# Patient Record
Sex: MALE | Race: WHITE | NOT HISPANIC OR LATINO | Employment: FULL TIME | URBAN - METROPOLITAN AREA
[De-identification: names, ages, dates, MRNs, and addresses within clinical notes are randomized per-mention and may not be internally consistent; named-entity substitution may affect disease eponyms.]

---

## 2024-07-16 ENCOUNTER — VIRTUAL VISIT (OUTPATIENT)
Dept: FAMILY MEDICINE | Facility: CLINIC | Age: 49
End: 2024-07-16
Payer: COMMERCIAL

## 2024-07-16 DIAGNOSIS — U07.1 INFECTION DUE TO 2019 NOVEL CORONAVIRUS: Primary | ICD-10-CM

## 2024-07-16 DIAGNOSIS — Z12.11 SCREEN FOR COLON CANCER: ICD-10-CM

## 2024-07-16 PROBLEM — G47.33 OSA ON CPAP: Status: ACTIVE | Noted: 2020-06-05

## 2024-07-16 PROBLEM — R20.0 NUMBNESS OF FEET: Status: ACTIVE | Noted: 2018-07-06

## 2024-07-16 PROCEDURE — 99203 OFFICE O/P NEW LOW 30 MIN: CPT | Mod: 95 | Performed by: FAMILY MEDICINE

## 2024-07-16 RX ORDER — CYCLOBENZAPRINE HCL 10 MG
5-10 TABLET ORAL
COMMUNITY
Start: 2023-09-13

## 2024-07-16 RX ORDER — MULTIVITAMIN
1 TABLET ORAL DAILY
COMMUNITY

## 2024-07-16 RX ORDER — VALACYCLOVIR HYDROCHLORIDE 1 G/1
TABLET, FILM COATED ORAL
COMMUNITY
Start: 2024-06-01 | End: 2024-09-09

## 2024-07-16 NOTE — PROGRESS NOTES
Mehrdad is a 48 year old who is being evaluated via a billable telephone visit.    How would you like to obtain your AVS? Mail a copy  If the video visit is dropped, the invitation should be resent by: Text to cell phone: [phone]  Will anyone else be joining your video visit? No  Originating Location (pt. Location): Home    Distant Location (provider location):  On-site    Assessment & Plan   Problem List Items Addressed This Visit    None  Visit Diagnoses       Infection due to 2019 novel coronavirus    -  Primary    Treatment as per orders.  Over-the-counter symptom control also reviewed.  Warning signs and symptoms for return to clinic or ER discussed    Relevant Medications    nirmatrelvir and ritonavir (PAXLOVID) 300 mg/100 mg therapy pack    Screen for colon cancer        Relevant Orders    Colonoscopy Screening  Referral             See Patient Instructions      Subjective   Mehrdad is a 48 year old, presenting for the following health issues:  Covid Concern (Positive COVID Test on 7/16/24; Sx's Began 7/14/24; Sx's--Chest Congestion/Fatigue/Runny Nose)      7/16/2024     2:32 PM   Additional Questions   Roomed by CHERYL Joe   Accompanied by Self         7/16/2024     2:32 PM   Patient Reported Additional Medications   Patient reports taking the following new medications N/A     Cough and cold symptoms started about a day day and a half ago.  Then developed fever and chills leading himself to testing for COVID.  He does work for [employer].  He had COVID test was positive.  This is the third time he has had COVID the last was over a year ago.  He is never been on Paxlovid for it but he also knows this and has heard that it can make you feel better.  He understands that there is a bad taste in the mouth.  His medications were reviewed as well as over-the-counter supplements.  He has no contraindications to taking the medication and he does have a positive COVID test.  He did not receive  the last round of COVID vaccinations.    History of Present Illness       Reason for visit:  Covid Positive 7/16/24  Symptom onset:  1-3 days ago  Symptoms include:  Chest Congestion/Fatigue/Runny Nose  Symptom intensity:  Moderate  Symptom progression:  Worsening  Had these symptoms before:  Yes  Has tried/received treatment for these symptoms:  No    He eats 2-3 servings of fruits and vegetables daily.He consumes 1 sweetened beverage(s) daily.He exercises with enough effort to increase his heart rate 60 or more minutes per day.  He exercises with enough effort to increase his heart rate 5 days per week.   He is taking medications regularly.         Objective    Vitals - Patient Reported  Weight (Patient Reported): 95.7 kg (211 lb)        Physical Exam  Nursing note reviewed.   Constitutional:       General: He is not in acute distress.     Appearance: Normal appearance. He is not ill-appearing.   HENT:      Head: Normocephalic and atraumatic.   Eyes:      Extraocular Movements: Extraocular movements intact.      Conjunctiva/sclera: Conjunctivae normal.   Pulmonary:      Effort: Pulmonary effort is normal.   Neurological:      Mental Status: He is alert and oriented to person, place, and time.   Psychiatric:         Attention and Perception: Attention normal.         Mood and Affect: Mood normal.         Speech: Speech normal.         Thought Content: Thought content normal.           Video phone call.  "[employer] video used.  Patient was at home in [place].  Provider was onsite.  Signed Electronically by: Elliot Soto DO

## 2024-07-18 ENCOUNTER — TELEPHONE (OUTPATIENT)
Dept: GASTROENTEROLOGY | Facility: CLINIC | Age: 49
End: 2024-07-18

## 2024-07-18 VITALS — HEIGHT: 74 IN | WEIGHT: 212 LBS | BODY MASS INDEX: 27.21 KG/M2

## 2024-07-18 NOTE — TELEPHONE ENCOUNTER
"Endoscopy Scheduling Screen    Have you had a positive Covid test in the last 14 days?  Yes (Schedule at least 14 days from symptom onset)    What is your communication preference for Instructions and/or Bowel Prep?   MyChart    What insurance is in the chart?  Other:  [facility]    Ordering/Referring Provider:     JOSÉ LUIS THAPA      (If ordering provider performs procedure, schedule with ordering provider unless otherwise instructed. )    BMI: There is no height or weight on file to calculate BMI.     Sedation Ordered  moderate sedation.   If patient BMI > 50 do not schedule in [facility].    If patient BMI > 45 do not schedule at [facility].    Are you taking methadone or Suboxone?  No    Have you had difficulties, pain, or discomfort during past endoscopy procedures?  No    Are you taking any prescription medications for pain 3 or more times per week?   NO, No RN review required.    Do you have a history of malignant hyperthermia?  No    (Females) Are you currently pregnant?        Have you been diagnosed or told you have pulmonary hypertension?   No    Do you have an LVAD?  No    Have you been told you have moderate to severe sleep apnea?  Yes (RN Review required for scheduling unless scheduling in Hospital.)  cpap  Have you been told you have COPD, asthma, or any other lung disease?  No    Do you have any heart conditions?  No     Have you ever had or are you waiting for an organ transplant?  No. Continue scheduling, no site restrictions.    Have you had a stroke or transient ischemic attack (TIA aka \"mini stroke\" in the last 6 months?   No    Have you been diagnosed with or been told you have cirrhosis of the liver?   No    Are you currently on dialysis?   No    Do you need assistance transferring?   No    BMI: There is no height or weight on file to calculate BMI.     Is patients BMI > 40 and scheduling location [facility]?  No    Do you take an injectable medication for weight loss or diabetes (excluding insulin)?  No    Do " you take the medication Naltrexone?  No    Do you take blood thinners?  No       Prep   Are you currently on dialysis or do you have chronic kidney disease?  No    Do you have a diagnosis of diabetes?  No    Do you have a diagnosis of cystic fibrosis (CF)?  No    On a regular basis do you go 3 -5 days between bowel movements?  No    BMI > 40?  No    Preferred Pharmacy:    [facility] [address] [facility] [address]  [address] [address]  Phone: [phone] Fax: [phone]      Final Scheduling Details     Procedure scheduled  Colonoscopy    Surgeon:  Rolan     Date of procedure:  9/26/24     Pre-OP / PAC:   No - Not required for this site.    Location  [facility] - Patient preference.    Sedation   Moderate Sedation - Per order.      Patient Reminders:   You will receive a call from a Nurse to review instructions and health history.  This assessment must be completed prior to your procedure.  Failure to complete the Nurse assessment may result in the procedure being cancelled.      On the day of your procedure, please designate an adult(s) who can drive you home stay with you for the next 24 hours. The medicines used in the exam will make you sleepy. You will not be able to drive.      You cannot take public transportation, ride share services, or non-medical taxi service without a responsible caregiver.  Medical transport services are allowed with the requirement that a responsible caregiver will receive you at your destination.  We require that drivers and caregivers are confirmed prior to your procedure.

## 2024-07-20 ENCOUNTER — HEALTH MAINTENANCE LETTER (OUTPATIENT)
Age: 49
End: 2024-07-20

## 2024-08-12 ENCOUNTER — TELEPHONE (OUTPATIENT)
Dept: FAMILY MEDICINE | Facility: CLINIC | Age: 49
End: 2024-08-12

## 2024-08-12 ENCOUNTER — MYC MEDICAL ADVICE (OUTPATIENT)
Dept: FAMILY MEDICINE | Facility: CLINIC | Age: 49
End: 2024-08-12

## 2024-08-12 NOTE — TELEPHONE ENCOUNTER
Patient Quality Outreach    Patient is due for the following:   Colon Cancer Screening    Next Steps:   Colon Cancer Screening    Type of outreach:    Sent [employer] message.      Questions for provider review:    None           Arik Joe Jr., MA  Chart routed to Care Team.

## 2024-09-05 NOTE — TELEPHONE ENCOUNTER
Standard Miralax Bowel Prep recommended due to standard bowel prep. Instructions were sent via [employer].

## 2024-09-09 ENCOUNTER — OFFICE VISIT (OUTPATIENT)
Dept: PEDIATRICS | Facility: CLINIC | Age: 49
End: 2024-09-09
Payer: COMMERCIAL

## 2024-09-09 VITALS
HEIGHT: 74 IN | BODY MASS INDEX: 27.85 KG/M2 | TEMPERATURE: 98.7 F | DIASTOLIC BLOOD PRESSURE: 70 MMHG | HEART RATE: 76 BPM | SYSTOLIC BLOOD PRESSURE: 110 MMHG | OXYGEN SATURATION: 96 % | RESPIRATION RATE: 14 BRPM | WEIGHT: 217 LBS

## 2024-09-09 DIAGNOSIS — B00.1 RECURRENT COLD SORES: ICD-10-CM

## 2024-09-09 DIAGNOSIS — Z23 ENCOUNTER FOR ADMINISTRATION OF VACCINE: ICD-10-CM

## 2024-09-09 DIAGNOSIS — D17.30 LIPOMA OF SKIN AND SUBCUTANEOUS TISSUE: ICD-10-CM

## 2024-09-09 DIAGNOSIS — L71.9 ROSACEA: ICD-10-CM

## 2024-09-09 DIAGNOSIS — Z00.00 HEALTH CARE MAINTENANCE: ICD-10-CM

## 2024-09-09 DIAGNOSIS — Z13.6 ENCOUNTER FOR SCREENING FOR CARDIOVASCULAR DISORDERS: ICD-10-CM

## 2024-09-09 DIAGNOSIS — Z76.89 ENCOUNTER TO ESTABLISH CARE: Primary | ICD-10-CM

## 2024-09-09 DIAGNOSIS — Z11.3 ROUTINE SCREENING FOR STI (SEXUALLY TRANSMITTED INFECTION): ICD-10-CM

## 2024-09-09 PROCEDURE — 90656 IIV3 VACC NO PRSV 0.5 ML IM: CPT | Performed by: NURSE PRACTITIONER

## 2024-09-09 PROCEDURE — 90472 IMMUNIZATION ADMIN EACH ADD: CPT | Performed by: NURSE PRACTITIONER

## 2024-09-09 PROCEDURE — 90715 TDAP VACCINE 7 YRS/> IM: CPT | Performed by: NURSE PRACTITIONER

## 2024-09-09 PROCEDURE — G2211 COMPLEX E/M VISIT ADD ON: HCPCS | Performed by: NURSE PRACTITIONER

## 2024-09-09 PROCEDURE — 90471 IMMUNIZATION ADMIN: CPT | Performed by: NURSE PRACTITIONER

## 2024-09-09 PROCEDURE — 99214 OFFICE O/P EST MOD 30 MIN: CPT | Performed by: NURSE PRACTITIONER

## 2024-09-09 RX ORDER — VALACYCLOVIR HYDROCHLORIDE 1 G/1
2000 TABLET, FILM COATED ORAL 2 TIMES DAILY
Qty: 4 TABLET | Refills: 3 | Status: SHIPPED | OUTPATIENT
Start: 2024-09-09

## 2024-09-09 ASSESSMENT — PAIN SCALES - GENERAL: PAINLEVEL: NO PAIN (0)

## 2024-09-09 NOTE — PROGRESS NOTES
Assessment & Plan     Encounter to establish care  Histories and medications reviewed and updated.     Lipoma of skin and subcutaneous tissue  History of lipomas, now with several more growths on his abdomen that have been progressively growing and becoming bothersome.   - Adult Gen Surg  Referral; Future    Rosacea  Chronic, stable. Refilled.   - metroNIDAZOLE (METROCREAM) 0.75 % external cream; Apply topically 2 times daily.    Recurrent cold sores  Chronic, stable. Refilled.   - valACYclovir (VALTREX) 1000 mg tablet; Take 2 tablets (2,000 mg) by mouth 2 times daily.    Encounter for screening for cardiovascular disorders  - Lipid panel reflex to direct LDL Fasting; Future    Routine screening for STI (sexually transmitted infection)  - HIV Antigen Antibody Combo; Future  - Treponema Abs w Reflex to RPR and Titer; Future  - Hepatitis C Screen Reflex to HCV RNA Quant and Genotype; Future  - NEISSERIA GONORRHOEA PCR; Future  - CHLAMYDIA TRACHOMATIS PCR; Future  - PRIMARY CARE FOLLOW-UP SCHEDULING; Future    Encounter for administration of vaccine  - TDAP 10-64Y (ADACEL,BOOSTRIX)  - INFLUENZA VACCINE,SPLIT VIRUS,TRIVALENT,PF(FLUZONE)    Health care maintenance  - Comprehensive metabolic panel (BMP + Alb, Alk Phos, ALT, AST, Total. Bili, TP); Future        The longitudinal plan of care for the diagnosis(es)/condition(s) as documented were addressed during this visit. Due to the added complexity in care, I will continue to support Adam in the subsequent management and with ongoing continuity of care.    Subjective   Adam is a 48 year old, presenting for the following health issues:  Musculoskeletal Problem      9/9/2024     1:47 PM   Additional Questions   Roomed by Nata BRIDGES   Accompanied by Self         9/9/2024     1:47 PM   Patient Reported Additional Medications   Patient reports taking the following new medications No       History of subcutaneous masses in abdomen and bilateral upper extremities. They  have been there for years. He thinks they are lipomas. He has had one removed in the past, about 7 years ago. The ones in his abdomen are growing and are not visible and are bothersome.     Recently switched into the [employer] system and needs some medication refills.     Rosacea - managed on topical metronidazole    Cold sores - uses valtrex as needed.     Patient Active Problem List   Diagnosis    Recurrent cold sores    ROBERT on CPAP    Numbness of feet    Idiopathic scoliosis and kyphoscoliosis     No past medical history on file.    Past Surgical History:   Procedure Laterality Date    ABDOMEN SURGERY      pyloric stenosis as a child    EYE MUSCLE SURGERY      childhood    HAND SURGERY Right 2022    INGUINAL HERNIA REPAIR Left     SHOULDER SURGERY Right 2010    VASECTOMY       Family History   Problem Relation Age of Onset    Heart Defect Father         thick valve?    Cancer Paternal Aunt     Cancer Cousin     Cancer Cousin      Social History     Socioeconomic History    Marital status:      Spouse name: Not on file    Number of children: Not on file    Years of education: Not on file    Highest education level: Not on file   Occupational History    Not on file   Tobacco Use    Smoking status: Never     Passive exposure: Never    Smokeless tobacco: Never   Vaping Use    Vaping status: Never Used   Substance and Sexual Activity    Alcohol use: Yes     Comment: 10/week    Drug use: Yes     Types: Marijuana    Sexual activity: Yes     Partners: Female   Other Topics Concern    Not on file   Social History Narrative     2 years ago.      Social Determinants of Health     Financial Resource Strain: Low Risk  (7/16/2024)    Financial Resource Strain     Within the past 12 months, have you or your family members you live with been unable to get utilities (heat, electricity) when it was really needed?: No   Food Insecurity: Low Risk  (7/16/2024)    Food Insecurity     Within the past 12 months, did you  worry that your food would run out before you got money to buy more?: No     Within the past 12 months, did the food you bought just not last and you didn t have money to get more?: No   Transportation Needs: Low Risk  (7/16/2024)    Transportation Needs     Within the past 12 months, has lack of transportation kept you from medical appointments, getting your medicines, non-medical meetings or appointments, work, or from getting things that you need?: No   Physical Activity: Insufficiently Active (1/13/2021)    Received from [facility]    Exercise Vital Sign     Days of Exercise per Week: 3 days     Minutes of Exercise per Session: 30 min   Stress: Not on file   Social Connections: Unknown (5/30/2023)    Received from [facility]    Social Connections     Frequency of Communication with Friends and Family: Not on file   Interpersonal Safety: Low Risk  (9/9/2024)    Interpersonal Safety     Do you feel physically and emotionally safe where you currently live?: Yes     Within the past 12 months, have you been hit, slapped, kicked or otherwise physically hurt by someone?: No     Within the past 12 months, have you been humiliated or emotionally abused in other ways by your partner or ex-partner?: No   Housing Stability: Low Risk  (7/16/2024)    Housing Stability     Do you have housing? : Yes     Are you worried about losing your housing?: No     Current Outpatient Medications   Medication Sig Dispense Refill    cyclobenzaprine (FLEXERIL) 10 MG tablet Take 5-10 mg by mouth      metroNIDAZOLE (METROCREAM) 0.75 % external cream Apply topically 2 times daily. 45 g 2    Multiple Vitamin (ONE-A-DAY ESSENTIAL) TABS Take 1 tablet by mouth daily      valACYclovir (VALTREX) 1000 mg tablet Take 2 tablets (2,000 mg) by mouth 2 times daily. 4 tablet 3     No current facility-administered medications for this visit.      No Known Allergies          Objective    BP  "110/70 (BP Location: Right arm, Patient Position: Sitting, Cuff Size: Adult Large)   Pulse 76   Temp 98.7  F (37.1  C) (Tympanic)   Resp 14   Ht 1.88 m (6' 2.02\")   Wt 98.4 kg (217 lb)   SpO2 96%   BMI 27.85 kg/m    Body mass index is 27.85 kg/m .  Physical Exam  Constitutional:       General: He is not in acute distress.     Appearance: Normal appearance. He is not ill-appearing or toxic-appearing.   Cardiovascular:      Rate and Rhythm: Normal rate.   Pulmonary:      Effort: Pulmonary effort is normal. No respiratory distress.   Abdominal:          Comments: Several subcutaneous masses palpated throughout abdomen as pictured.    Skin:     General: Skin is warm and dry.   Neurological:      General: No focal deficit present.      Mental Status: He is alert and oriented to person, place, and time.                    Signed Electronically by: AMY Lopez CNP    "

## 2024-09-12 ENCOUNTER — TELEPHONE (OUTPATIENT)
Dept: GASTROENTEROLOGY | Facility: CLINIC | Age: 49
End: 2024-09-12
Payer: COMMERCIAL

## 2024-09-12 NOTE — TELEPHONE ENCOUNTER
Pre visit planning completed.      Procedure details:    Patient scheduled for Colonoscopy on 9/26/24.     Arrival time: 0825. Procedure time 0910    Facility location: [facility]; [address] [place] [address]. Check in location: Main entrance, door #1 on the North side of the building under roundabout awning. DO NOT GO TO SURGERY/ED ENTRANCE.     Sedation type: Conscious sedation     Pre op exam needed? No.    Indication for procedure: screening       Chart review:     Electronic implanted devices? No    Recent diagnosis of diverticulitis within the last 6 weeks? No      Medication review:    Diabetic? No    Anticoagulants? No    Weight loss medication/injectable? No.    Other medication HOLDING recommendations:  N/A      Prep for procedure:     Bowel prep recommendation: Standard Miralax  Due to: standard bowel prep.    Prep instructions sent via [employer]         Jennifer Oliver RN  Endoscopy Procedure Pre Assessment RN  [phone] option 2

## 2024-09-12 NOTE — TELEPHONE ENCOUNTER
Pre assessment completed for upcoming procedure.   (Please see previous telephone encounter notes for complete details)      Procedure details:    Arrival time and facility location reviewed.    Pre op exam needed? No.    Designated  policy reviewed. Instructed to have someone stay 6  hours post procedure.       Medication review:    Medications reviewed. Please see supporting documentation below. Holding recommendations discussed (if applicable).       Prep for procedure:     Procedure prep instructions reviewed.        Any additional information needed:  N/A      Patient  verbalized understanding and had no questions or concerns at this time.      Jennifer Oliver RN  Endoscopy Procedure Pre Assessment   [phone] option 2

## 2024-09-13 NOTE — TELEPHONE ENCOUNTER
Patient Quality Outreach    Patient is due for the following:   Colon Cancer Screening    Next Steps:   Patient was scheduled for 9/26/24    Type of outreach:    Chart review performed, no outreach needed.      Questions for provider review:    None           Arik Joe Jr., MA

## 2024-09-24 ENCOUNTER — TELEPHONE (OUTPATIENT)
Dept: GASTROENTEROLOGY | Facility: CLINIC | Age: 49
End: 2024-09-24
Payer: COMMERCIAL

## 2024-09-24 NOTE — TELEPHONE ENCOUNTER
Caller: Adam    Reason for Reschedule/Cancellation   (please be detailed, any staff messages or encounters to note?): death in family      Prior to reschedule please review:  Ordering Provider: JOSÉ LUIS THAPA   Sedation Determined: CS  Does patient have any ASC Exclusions, please identify?: yes--ROBERT      Notes on Cancelled Procedure:  Procedure: Lower Endoscopy [Colonoscopy]   Date: 9/26  Location: [facility]; [facility] [address] [place] [address]  Surgeon: Rolan      Rescheduled: Yes,   Procedure: Lower Endoscopy [Colonoscopy]    Date: 10/21   Location: [facility]; [facility] [address] [place] [address]   Surgeon: Oscar   Sedation Level Scheduled  CS ,  Reason for Sedation Level order   Instructions updated and sent: y     Does patient need PAC or Pre -Op Rescheduled? : n       Did you cancel or rescheduled an EUS procedure? No.

## 2024-09-27 ENCOUNTER — LAB (OUTPATIENT)
Dept: LAB | Facility: CLINIC | Age: 49
End: 2024-09-27
Attending: NURSE PRACTITIONER
Payer: COMMERCIAL

## 2024-09-27 DIAGNOSIS — Z00.00 HEALTH CARE MAINTENANCE: ICD-10-CM

## 2024-09-27 DIAGNOSIS — Z13.6 ENCOUNTER FOR SCREENING FOR CARDIOVASCULAR DISORDERS: ICD-10-CM

## 2024-09-27 DIAGNOSIS — Z11.3 ROUTINE SCREENING FOR STI (SEXUALLY TRANSMITTED INFECTION): ICD-10-CM

## 2024-09-27 LAB — T PALLIDUM AB SER QL: NONREACTIVE

## 2024-09-27 PROCEDURE — 87389 HIV-1 AG W/HIV-1&-2 AB AG IA: CPT

## 2024-09-27 PROCEDURE — 86803 HEPATITIS C AB TEST: CPT

## 2024-09-27 PROCEDURE — 87491 CHLMYD TRACH DNA AMP PROBE: CPT

## 2024-09-27 PROCEDURE — 80061 LIPID PANEL: CPT

## 2024-09-27 PROCEDURE — 87591 N.GONORRHOEAE DNA AMP PROB: CPT

## 2024-09-27 PROCEDURE — 86780 TREPONEMA PALLIDUM: CPT

## 2024-09-27 PROCEDURE — 80053 COMPREHEN METABOLIC PANEL: CPT

## 2024-09-27 PROCEDURE — 36415 COLL VENOUS BLD VENIPUNCTURE: CPT

## 2024-09-28 LAB
ALBUMIN SERPL BCG-MCNC: 4.6 G/DL (ref 3.5–5.2)
ALP SERPL-CCNC: 54 U/L (ref 40–150)
ALT SERPL W P-5'-P-CCNC: 23 U/L (ref 0–70)
ANION GAP SERPL CALCULATED.3IONS-SCNC: 11 MMOL/L (ref 7–15)
AST SERPL W P-5'-P-CCNC: 26 U/L (ref 0–45)
BILIRUB SERPL-MCNC: 0.5 MG/DL
BUN SERPL-MCNC: 14.3 MG/DL (ref 6–20)
C TRACH DNA SPEC QL NAA+PROBE: NEGATIVE
CALCIUM SERPL-MCNC: 9.6 MG/DL (ref 8.8–10.4)
CHLORIDE SERPL-SCNC: 102 MMOL/L (ref 98–107)
CHOLEST SERPL-MCNC: 153 MG/DL
CREAT SERPL-MCNC: 0.96 MG/DL (ref 0.67–1.17)
EGFRCR SERPLBLD CKD-EPI 2021: >90 ML/MIN/1.73M2
FASTING STATUS PATIENT QL REPORTED: ABNORMAL
FASTING STATUS PATIENT QL REPORTED: NORMAL
GLUCOSE SERPL-MCNC: 107 MG/DL (ref 70–99)
HCO3 SERPL-SCNC: 26 MMOL/L (ref 22–29)
HCV AB SERPL QL IA: NONREACTIVE
HDLC SERPL-MCNC: 45 MG/DL
HIV 1+2 AB+HIV1 P24 AG SERPL QL IA: NONREACTIVE
LDLC SERPL CALC-MCNC: 91 MG/DL
N GONORRHOEA DNA SPEC QL NAA+PROBE: NEGATIVE
NONHDLC SERPL-MCNC: 108 MG/DL
POTASSIUM SERPL-SCNC: 4.6 MMOL/L (ref 3.4–5.3)
PROT SERPL-MCNC: 7.3 G/DL (ref 6.4–8.3)
SODIUM SERPL-SCNC: 139 MMOL/L (ref 135–145)
TRIGL SERPL-MCNC: 87 MG/DL

## 2024-09-30 ENCOUNTER — OFFICE VISIT (OUTPATIENT)
Dept: SURGERY | Facility: CLINIC | Age: 49
End: 2024-09-30
Payer: COMMERCIAL

## 2024-09-30 VITALS
HEIGHT: 74 IN | WEIGHT: 217 LBS | SYSTOLIC BLOOD PRESSURE: 118 MMHG | BODY MASS INDEX: 27.85 KG/M2 | HEART RATE: 83 BPM | OXYGEN SATURATION: 98 % | DIASTOLIC BLOOD PRESSURE: 70 MMHG | RESPIRATION RATE: 16 BRPM

## 2024-09-30 DIAGNOSIS — D17.30 LIPOMA OF SKIN AND SUBCUTANEOUS TISSUE: ICD-10-CM

## 2024-09-30 PROCEDURE — 99203 OFFICE O/P NEW LOW 30 MIN: CPT | Performed by: SURGERY

## 2024-09-30 NOTE — PATIENT INSTRUCTIONS
OFFICE PROCEDURE     Date: 10/21/2024  Time: 10:30 AM   Location: [facility]      [address] [place] [address] [address],   [place] [address]        Please check in at 10:15 AM

## 2024-09-30 NOTE — LETTER
September 30, 2024          Teri Melendez, AMY CNP  [address] [place] [address]      RE:   Mehrdad Sharif 1975      Dear Colleague,    Thank you for referring your patient, Mehrdad Sharif, to [facility]. Please see a copy of my visit note below.    HPI:  Mehrdad presents today for several subcutaneous masses on his abdominal wall for the past year or so. He noticed them become more pronounced with recent intentional weight loss. He has had a few similar lesions removed in the past (7y ago) which he stated was done in a clinic under local. They occasionally cause pain and may be growing.    PE:  There were no vitals taken for this visit.  General appearance: well-nourished, no apparent distress  Skin: There are several subcutaneous firm masses on the left abdominal wall (3) and one on the right abdominal wall. They measure between 2-3cm and are firm and mobile.         Plan:  We will schedule excision at his convenience. They can readily be removed under local in the clinic.      Again, thank you for allowing me to participate in the care of your patient.      Sincerely,      Robert Swenson MD    O/[facility]

## 2024-09-30 NOTE — PROGRESS NOTES
HPI:  Mehrdad presents today for several subcutaneous masses on his abdominal wall for the past year or so. He noticed them become more pronounced with recent intentional weight loss. He has had a few similar lesions removed in the past (7y ago) which he stated was done in a clinic under local. They occasionally cause pain and may be growing.    PE:  There were no vitals taken for this visit.  General appearance: well-nourished, no apparent distress  Skin: There are several subcutaneous firm masses on the left abdominal wall (3) and one on the right abdominal wall. They measure between 2-3cm and are firm and mobile.         Plan:  We will schedule excision at his convenience. They can readily be removed under local in the clinic.    Robert Swenson MD    Please route or send letter to:  Primary Care Provider (PCP)

## 2024-10-17 ENCOUNTER — OFFICE VISIT (OUTPATIENT)
Dept: SURGERY | Facility: CLINIC | Age: 49
End: 2024-10-17
Payer: COMMERCIAL

## 2024-10-17 VITALS
HEIGHT: 74 IN | BODY MASS INDEX: 27.85 KG/M2 | SYSTOLIC BLOOD PRESSURE: 122 MMHG | WEIGHT: 217 LBS | OXYGEN SATURATION: 97 % | HEART RATE: 69 BPM | RESPIRATION RATE: 16 BRPM | DIASTOLIC BLOOD PRESSURE: 78 MMHG

## 2024-10-17 DIAGNOSIS — D17.30 LIPOMA OF SKIN AND SUBCUTANEOUS TISSUE: Primary | ICD-10-CM

## 2024-10-17 PROCEDURE — 22903 EXC ABD LES SC 3 CM/>: CPT | Mod: 51 | Performed by: SURGERY

## 2024-10-17 PROCEDURE — 22902 EXC ABD LES SC < 3 CM: CPT | Mod: 59 | Performed by: SURGERY

## 2024-10-17 PROCEDURE — 88304 TISSUE EXAM BY PATHOLOGIST: CPT | Performed by: PATHOLOGY

## 2024-10-17 NOTE — PROGRESS NOTES
General Surgery Operative Note      Pre-operative diagnosis: Left and right abdominal wall subcutaneous masses   Post-operative diagnosis: Same   Procedure: Excision of four abdominal wall subcutaneous masses (1, 2, 3, 4cm cm)   Surgeon: Robert Mendoza MD   Assistant(s): NONE   Anesthesia: Local    Estimated blood loss:   2 cc     Specimens: * Cannot find log *       The abdomen was prepped and draped in standard sterile fashion.  The overlying skin of each mass was anesthetized with local anesthetic.  Incisions were made over each with lengths of 1-2 cm.  The incisions were carried into the subcutaneous tissue and the masses were completely excised from surrounding tissues using a combination of sharp and blunt dissection. The masses, which were 1, 2, 3 and 4 cm in diameter and had a fatty appearance, were then passed off the field as specimen in a single containiner. Hemostasis was maintained throughout with electrocautery. The wounds were then irrigated with sterile saline and closed in two layers. The skin was closed with 4-0 Vicryl subcuticular suture and Steri-strips.  The patient tolerated the procedure well.   Robert Swenson MD

## 2024-10-17 NOTE — LETTER
October 17, 2024        RE:   Mehrdad Sharif 1975      Dear Colleague,    Thank you for referring your patient, Mehrdad Sharif, to [facility]. Please see a copy of my visit note below.    General Surgery Operative Note      Pre-operative diagnosis: Left and right abdominal wall subcutaneous masses   Post-operative diagnosis: Same   Procedure: Excision of four abdominal wall subcutaneous masses (1, 2, 3, 4cm cm)   Surgeon: Robert Mendoza MD   Assistant(s): NONE   Anesthesia: Local    Estimated blood loss:   2 cc     Specimens: * Cannot find log *     The abdomen was prepped and draped in standard sterile fashion.  The overlying skin of each mass was anesthetized with local anesthetic.  Incisions were made over each with lengths of 1-2 cm.  The incisions were carried into the subcutaneous tissue and the masses were completely excised from surrounding tissues using a combination of sharp and blunt dissection. The masses, which were 1, 2, 3 and 4 cm in diameter and had a fatty appearance, were then passed off the field as specimen in a single containiner. Hemostasis was maintained throughout with electrocautery. The wounds were then irrigated with sterile saline and closed in two layers. The skin was closed with 4-0 Vicryl subcuticular suture and Steri-strips.  The patient tolerated the procedure well.     Again, thank you for allowing me to participate in the care of your patient.      Sincerely,      Robert Swenson MD  O/[facility]

## 2024-10-17 NOTE — PROGRESS NOTES
The following medication was given:     MEDICATION: Lidocaine HCL 1% injection  LOT #: [id number]  NDC #:  2740-4289-82  :  hospira  EXPIRATION DATE:  7/1/2025       The following medication was given:     MEDICATION: 8.4% sodium bicarbonate  LOT #: [id number]  NDC #:  [address]-4  :  oswald  EXPIRATION DATE:  12/2025

## 2024-10-21 ENCOUNTER — HOSPITAL ENCOUNTER (OUTPATIENT)
Facility: CLINIC | Age: 49
Discharge: HOME | End: 2024-10-21
Attending: INTERNAL MEDICINE | Admitting: INTERNAL MEDICINE
Payer: COMMERCIAL

## 2024-10-21 VITALS
SYSTOLIC BLOOD PRESSURE: 116 MMHG | DIASTOLIC BLOOD PRESSURE: 76 MMHG | RESPIRATION RATE: 16 BRPM | OXYGEN SATURATION: 96 % | HEART RATE: 68 BPM

## 2024-10-21 LAB
COLONOSCOPY: NORMAL
PATH REPORT.COMMENTS IMP SPEC: NORMAL
PATH REPORT.COMMENTS IMP SPEC: NORMAL
PATH REPORT.FINAL DX SPEC: NORMAL
PATH REPORT.GROSS SPEC: NORMAL
PATH REPORT.MICROSCOPIC SPEC OTHER STN: NORMAL
PATH REPORT.RELEVANT HX SPEC: NORMAL
PHOTO IMAGE: NORMAL

## 2024-10-21 PROCEDURE — G0500 MOD SEDAT ENDO SERVICE >5YRS: HCPCS | Performed by: INTERNAL MEDICINE

## 2024-10-21 PROCEDURE — 45378 DIAGNOSTIC COLONOSCOPY: CPT | Performed by: INTERNAL MEDICINE

## 2024-10-21 PROCEDURE — G0121 COLON CA SCRN NOT HI RSK IND: HCPCS | Performed by: INTERNAL MEDICINE

## 2024-10-21 PROCEDURE — 250N000011 HC RX IP 250 OP 636: Performed by: INTERNAL MEDICINE

## 2024-10-21 RX ORDER — NALOXONE HYDROCHLORIDE 0.4 MG/ML
0.2 INJECTION, SOLUTION INTRAMUSCULAR; INTRAVENOUS; SUBCUTANEOUS
Status: DISCONTINUED | OUTPATIENT
Start: 2024-10-21 | End: 2024-10-21 | Stop reason: HOSPADM

## 2024-10-21 RX ORDER — FENTANYL CITRATE 50 UG/ML
50-100 INJECTION, SOLUTION INTRAMUSCULAR; INTRAVENOUS EVERY 5 MIN PRN
Status: DISCONTINUED | OUTPATIENT
Start: 2024-10-21 | End: 2024-10-21 | Stop reason: HOSPADM

## 2024-10-21 RX ORDER — ONDANSETRON 4 MG/1
4 TABLET, ORALLY DISINTEGRATING ORAL EVERY 6 HOURS PRN
Status: DISCONTINUED | OUTPATIENT
Start: 2024-10-21 | End: 2024-10-21 | Stop reason: HOSPADM

## 2024-10-21 RX ORDER — FLUMAZENIL 0.1 MG/ML
0.2 INJECTION, SOLUTION INTRAVENOUS
Status: DISCONTINUED | OUTPATIENT
Start: 2024-10-21 | End: 2024-10-21 | Stop reason: HOSPADM

## 2024-10-21 RX ORDER — ONDANSETRON 2 MG/ML
4 INJECTION INTRAMUSCULAR; INTRAVENOUS EVERY 6 HOURS PRN
Status: DISCONTINUED | OUTPATIENT
Start: 2024-10-21 | End: 2024-10-21 | Stop reason: HOSPADM

## 2024-10-21 RX ORDER — ONDANSETRON 2 MG/ML
4 INJECTION INTRAMUSCULAR; INTRAVENOUS
Status: DISCONTINUED | OUTPATIENT
Start: 2024-10-21 | End: 2024-10-21 | Stop reason: HOSPADM

## 2024-10-21 RX ORDER — LIDOCAINE 40 MG/G
CREAM TOPICAL
Status: DISCONTINUED | OUTPATIENT
Start: 2024-10-21 | End: 2024-10-21 | Stop reason: HOSPADM

## 2024-10-21 RX ORDER — NALOXONE HYDROCHLORIDE 0.4 MG/ML
0.4 INJECTION, SOLUTION INTRAMUSCULAR; INTRAVENOUS; SUBCUTANEOUS
Status: DISCONTINUED | OUTPATIENT
Start: 2024-10-21 | End: 2024-10-21 | Stop reason: HOSPADM

## 2024-10-21 RX ORDER — PROCHLORPERAZINE MALEATE 10 MG
10 TABLET ORAL EVERY 6 HOURS PRN
Status: DISCONTINUED | OUTPATIENT
Start: 2024-10-21 | End: 2024-10-21 | Stop reason: HOSPADM

## 2024-10-21 RX ORDER — EPINEPHRINE 1 MG/ML
0.1 INJECTION, SOLUTION, CONCENTRATE INTRAVENOUS
Status: DISCONTINUED | OUTPATIENT
Start: 2024-10-21 | End: 2024-10-21 | Stop reason: HOSPADM

## 2024-10-21 RX ORDER — DIPHENHYDRAMINE HYDROCHLORIDE 50 MG/ML
25-50 INJECTION INTRAMUSCULAR; INTRAVENOUS
Status: DISCONTINUED | OUTPATIENT
Start: 2024-10-21 | End: 2024-10-21 | Stop reason: HOSPADM

## 2024-10-21 RX ORDER — ATROPINE SULFATE 0.1 MG/ML
1 INJECTION INTRAVENOUS
Status: DISCONTINUED | OUTPATIENT
Start: 2024-10-21 | End: 2024-10-21 | Stop reason: HOSPADM

## 2024-10-21 RX ORDER — SIMETHICONE 40MG/0.6ML
133 SUSPENSION, DROPS(FINAL DOSAGE FORM)(ML) ORAL
Status: DISCONTINUED | OUTPATIENT
Start: 2024-10-21 | End: 2024-10-21 | Stop reason: HOSPADM

## 2024-10-21 RX ADMIN — FENTANYL CITRATE 50 MCG: 50 INJECTION, SOLUTION INTRAMUSCULAR; INTRAVENOUS at 13:05

## 2024-10-21 RX ADMIN — MIDAZOLAM 2 MG: 1 INJECTION INTRAMUSCULAR; INTRAVENOUS at 13:08

## 2024-10-21 RX ADMIN — MIDAZOLAM 2 MG: 1 INJECTION INTRAMUSCULAR; INTRAVENOUS at 13:03

## 2024-10-21 RX ADMIN — MIDAZOLAM 2 MG: 1 INJECTION INTRAMUSCULAR; INTRAVENOUS at 13:05

## 2024-10-21 RX ADMIN — FENTANYL CITRATE 50 MCG: 50 INJECTION, SOLUTION INTRAMUSCULAR; INTRAVENOUS at 13:03

## 2024-10-21 ASSESSMENT — ACTIVITIES OF DAILY LIVING (ADL): ADLS_ACUITY_SCORE: 35

## 2024-10-21 NOTE — DISCHARGE INSTRUCTIONS
The patient has received a copy of the Provation report the doctor has written and discharge instructions have been discussed with the patient and responsible adult.  All questions were addressed and answered prior to patient discharge.    Hemorrhoids: Care Instructions  Overview     Hemorrhoids are swollen veins that develop in the anal canal. Bleeding during bowel movements, itching, and rectal pain are the most common symptoms. Hemorrhoids can be uncomfortable at times, but rarely are they a serious problem.  Most of the time, you can treat them with simple changes to your diet and bowel habits. These changes include eating more fiber and not straining to pass stools. Most hemorrhoids don't need surgery or other treatment unless they are very large and painful or bleed a lot.  Follow-up care is a key part of your treatment and safety. Be sure to make and go to all appointments, and call your doctor if you are having problems. It's also a good idea to know your test results and keep a list of the medicines you take.  How can you care for yourself at home?  Sit in a few inches of warm water (sitz bath) 3 times a day and after bowel movements. The warm water helps with pain and itching.  Put ice on your anal area several times a day for 10 minutes at a time. Put a thin cloth between the ice and your skin. Follow this by placing a warm, wet towel on the area for another 10 to 20 minutes.  Take pain medicines exactly as directed.  If the doctor gave you a prescription medicine for pain, take it as prescribed.  If you are not taking a prescription pain medicine, ask your doctor if you can take an over-the-counter medicine.  Keep the anal area clean, but be gentle. Use water and a fragrance-free soap, or use baby wipes or medicated pads such as Tucks.  Wear cotton underwear and loose clothing to decrease moisture in the anal area.  Eat more fiber. Include foods such as whole-grain breads and cereals, raw vegetables, raw  "and dried fruits, and beans.  Drink plenty of fluids. If you have kidney, heart, or liver disease and have to limit fluids, talk with your doctor before you increase the amount of fluids you drink.  Use a stool softener that contains bran or psyllium. You can save money by buying bran or psyllium (available in bulk at most health food stores) and sprinkling it on foods or stirring it into fruit juice. Or you can use a product such as Metamucil or Hydrocil.  Practice healthy bowel habits.  Go to the bathroom as soon as you have the urge.  Avoid straining to pass stools. Relax and give yourself time to let things happen naturally.  Do not hold your breath while passing stools.  Do not read while sitting on the toilet. Get off the toilet as soon as you have finished.  Take your medicines exactly as prescribed. Call your doctor if you think you are having a problem with your medicine.  When should you call for help?   Call 911 anytime you think you may need emergency care. For example, call if:    You pass maroon or very bloody stools.   Call your doctor now or seek immediate medical care if:    You have increased pain.     You have increased bleeding.   Watch closely for changes in your health, and be sure to contact your doctor if:    Your symptoms have not improved after 3 or 4 days.   Where can you learn more?  Go to https://www.[employer].net/patiented  Enter F228 in the search box to learn more about \"Hemorrhoids: Care Instructions.\"  Current as of: October 19, 2023  Content Version: 14.2 2024 [facility] [email or web address].   Care instructions adapted under license by your healthcare professional. If you have questions about a medical condition or this instruction, always ask your healthcare professional. [employer]wise[employer] Incorporated disclaims any warranty or liability for your use of this information.  High-Fiber Diet: Care Instructions  Overview     A high-fiber diet may help you relieve constipation and feel less " "bloated.  Your doctor and dietitian will help you make a high-fiber eating plan based on your personal needs. The plan will include the things you like to eat. It will also make sure that you get 25 to 35 grams of fiber a day.  Before you make changes to the way you eat, be sure to talk with your doctor or dietitian.  Follow-up care is a key part of your treatment and safety. Be sure to make and go to all appointments, and call your doctor if you are having problems. It's also a good idea to know your test results and keep a list of the medicines you take.  How can you care for yourself at home?  You can increase how much fiber you get if you eat more of certain foods. These foods include:  Whole-grain breads and cereals.  Fruits, such as pears, apples, and peaches. Eat the skins and peels if you can.  Vegetables, such as broccoli, cabbage, spinach, carrots, asparagus, and squash.  Starchy vegetables. These include potatoes with skins, kidney beans, and lima beans.  Take a fiber supplement every day if your doctor recommends it. Examples are Benefiber, Citrucel, FiberCon, and Metamucil. Ask your doctor how much to take.  Drink plenty of fluids. If you have kidney, heart, or liver disease and have to limit fluids, talk with your doctor before you increase the amount of fluids you drink.  Where can you learn more?  Go to https://www.[employer].net/patiented  Enter U654 in the search box to learn more about \"High-Fiber Diet: Care Instructions.\"  Current as of: September 20, 2023  Content Version: 14.2 2024 [facility] [employer].   Care instructions adapted under license by your healthcare professional. If you have questions about a medical condition or this instruction, always ask your healthcare professional. Healthwise, Incorporated disclaims any warranty or liability for your use of this information.    "

## 2024-10-21 NOTE — H&P
[facility]  Pre-Endoscopy History and Physical     Mehrdad Sharif MRN# [id number]   YOB: 1975 Age: 49 year old     Date of Procedure: 10/21/2024  Primary care provider: Teri Melendez  Type of Endoscopy: colonoscopy  Reason for Procedure: screening  Type of Anesthesia Anticipated: Moderate Sedation    HPI:    Mehrdad is a 49 year old male who will be undergoing the above procedure.      A history and physical has been performed. The patient's medications and allergies have been reviewed. The risks and benefits of the procedure and the sedation options and risks were discussed with the patient.  All questions were answered and informed consent was obtained.      He denies a personal or family history of anesthesia complications or bleeding disorders.     No Known Allergies     Prior to Admission Medications   Prescriptions Last Dose Informant Patient Reported? Taking?   Multiple Vitamin (ONE-A-DAY ESSENTIAL) TABS Past Week  Yes Yes   Sig: Take 1 tablet by mouth daily   cyclobenzaprine (FLEXERIL) 10 MG tablet More than a month  Yes Yes   Sig: Take 5-10 mg by mouth.   metroNIDAZOLE (METROCREAM) 0.75 % external cream 10/21/2024 at 0800  No Yes   Sig: Apply topically 2 times daily.   valACYclovir (VALTREX) 1000 mg tablet Past Month  No Yes   Sig: Take 2 tablets (2,000 mg) by mouth 2 times daily.      Facility-Administered Medications: None       Patient Active Problem List   Diagnosis    Recurrent cold sores    ROBERT on CPAP    Numbness of feet    Idiopathic scoliosis and kyphoscoliosis        History reviewed. No pertinent past medical history.     Past Surgical History:   Procedure Laterality Date    ABDOMEN SURGERY      pyloric stenosis as a child    EYE MUSCLE SURGERY      childhood    HAND SURGERY Right 2022    INGUINAL HERNIA REPAIR Left 2005    SHOULDER SURGERY Right 2010    VASECTOMY         Social History     Tobacco Use    Smoking status: Never     Passive exposure:  "Never    Smokeless tobacco: Never   Substance Use Topics    Alcohol use: Yes     Comment: 10/week       Family History   Problem Relation Age of Onset    Heart Defect Father         thick valve?    Cancer Cousin     Cancer Cousin     Cancer Paternal Aunt     Colon Cancer No family hx of        REVIEW OF SYSTEMS:     5 point ROS negative except as noted above in HPI, including Gen., Resp., CV, GI &  system review.      PHYSICAL EXAM:   There were no vitals taken for this visit. Estimated body mass index is 27.86 kg/m  as calculated from the following:    Height as of 10/17/24: 1.88 m (6' 2\").    Weight as of 10/17/24: 98.4 kg (217 lb).   GENERAL APPEARANCE: healthy, alert, and no distress  MENTAL STATUS: alert  AIRWAY EXAM: Mallampatti Class II (visualization of the soft palate, fauces, and uvula)  RESP: lungs clear to auscultation - no rales, rhonchi or wheezes  CV: regular rates and rhythm and normal S1 S2, no S3 or S4      DIAGNOSTICS:    Not indicated      IMPRESSION   ASA Class 2 - Mild systemic disease        PLAN:       Plan for colonoscopy. We discussed the risks, benefits and alternatives and the patient wished to proceed.    The above has been forwarded to the consulting provider.      Signed Electronically by: Bryan Moore MD,MD  October 21, 2024      [facility]  Ph: [phone] Fax: [phone]      "

## 2024-10-30 ENCOUNTER — OFFICE VISIT (OUTPATIENT)
Dept: PEDIATRICS | Facility: CLINIC | Age: 49
End: 2024-10-30
Payer: COMMERCIAL

## 2024-10-30 ENCOUNTER — ANCILLARY PROCEDURE (OUTPATIENT)
Dept: GENERAL RADIOLOGY | Facility: CLINIC | Age: 49
End: 2024-10-30
Attending: NURSE PRACTITIONER
Payer: COMMERCIAL

## 2024-10-30 VITALS
OXYGEN SATURATION: 97 % | HEIGHT: 74 IN | BODY MASS INDEX: 27.78 KG/M2 | TEMPERATURE: 97.6 F | SYSTOLIC BLOOD PRESSURE: 110 MMHG | DIASTOLIC BLOOD PRESSURE: 60 MMHG | HEART RATE: 80 BPM | RESPIRATION RATE: 18 BRPM | WEIGHT: 216.5 LBS

## 2024-10-30 DIAGNOSIS — M79.645 PAIN OF FINGER OF LEFT HAND: ICD-10-CM

## 2024-10-30 DIAGNOSIS — Z87.19 HISTORY OF HERNIA REPAIR: Primary | ICD-10-CM

## 2024-10-30 DIAGNOSIS — Z98.890 HISTORY OF HERNIA REPAIR: Primary | ICD-10-CM

## 2024-10-30 DIAGNOSIS — R30.0 DYSURIA: ICD-10-CM

## 2024-10-30 DIAGNOSIS — N52.9 ERECTILE DYSFUNCTION, UNSPECIFIED ERECTILE DYSFUNCTION TYPE: ICD-10-CM

## 2024-10-30 PROCEDURE — G2211 COMPLEX E/M VISIT ADD ON: HCPCS | Performed by: NURSE PRACTITIONER

## 2024-10-30 PROCEDURE — 36415 COLL VENOUS BLD VENIPUNCTURE: CPT | Performed by: NURSE PRACTITIONER

## 2024-10-30 PROCEDURE — 99214 OFFICE O/P EST MOD 30 MIN: CPT | Mod: 24 | Performed by: NURSE PRACTITIONER

## 2024-10-30 PROCEDURE — 73140 X-RAY EXAM OF FINGER(S): CPT | Mod: TC | Performed by: RADIOLOGY

## 2024-10-30 PROCEDURE — 84550 ASSAY OF BLOOD/URIC ACID: CPT | Performed by: NURSE PRACTITIONER

## 2024-10-30 RX ORDER — TADALAFIL 10 MG/1
TABLET ORAL
Qty: 30 TABLET | Refills: 0 | Status: SHIPPED | OUTPATIENT
Start: 2024-10-30

## 2024-10-30 ASSESSMENT — PAIN SCALES - GENERAL: PAINLEVEL_OUTOF10: EXTREME PAIN (8)

## 2024-10-30 NOTE — PROGRESS NOTES
Assessment & Plan     History of hernia repair  Worsening left groin pain over the past three months. Given his history of left inguinal hernia repair and revision, will have him see general surgery for consult. No red flag symptoms identified warranting imaging.  - Adult Gen Surg  Referral; Future    Dysuria  Improving. Less of pain with urination, more of a sensation/irritation. STI screening completed one month ago and was negative, has not been sexually active since that time so unlikely to be STI. Also considered UTI and epididymitis, however he declines UA and treatment for epididymitis at this time, would prefer to see generally surgery about the possibility of recurring hernia first.     Pain of finger of left hand  Etiology unclear. X-ray negative for fracture or dislocation. Uric acid pending. If uric acid normal, would recommend OT for hand therapy.   - XR Finger Left G/E 2 Views; Future  - Uric acid    Erectile dysfunction, unspecified erectile dysfunction type  Trial of PDE5 inhibitor. No contraindications identified.   - tadalafil (CIALIS) 10 MG tablet; Take one tablet (10 mg) by mouth as needed >=30 minutes prior to anticipated sexual activity.      The longitudinal plan of care for the diagnosis(es)/condition(s) as documented were addressed during this visit. Due to the added complexity in care, I will continue to support Adam in the subsequent management and with ongoing continuity of care.    Subjective   Adam is a 49 year old, presenting for the following health issues:  Urinary Problem      10/30/2024     9:17 AM   Additional Questions   Roomed by Suzanna Santillan   Accompanied by LIZZ       Patient presents today with three concerns.     #left groin pain  History of left inguinal hernia repair in 2005. Reports he had mesh placed at that time that was later recalled and about 8 years ago, he had the mesh removed. Since having the mesh removed, has intermittent left groin discomfort, about one  "month out of the year. Has been able to determine triggering activities and avoids those in order to manage symptoms. Over the past three months, left groin pain has progressively worsened and is now very consistent. Radiates to lower left abdomen and left testicle. Denies bulging to left groin. Pain is much worse when pressing on the area. Reports pain is right where his hernia was.    #dysuria  Has noticed some \"irritation\" or \"tired feeling\" behind his testicles upon urination over the past three months, since left groin pain has worsened. This symptom has actually improved with time, back to 80% of normal. STI screening done one month ago, negative. Has not had intercourse since that time.     #ED  Feels this was initially psychogenic but is interested in trying a medication, specifically cialis.     #finger pain  Reports a one month history of pain to metacarpophalangeal joint in left hand. No known injury. No swelling, redness, or warmth. Right hand dominant. No history of gout.             Objective    /60 (BP Location: Right arm, Patient Position: Sitting, Cuff Size: Adult Large)   Pulse 80   Temp 97.6  F (36.4  C) (Temporal)   Resp 18   Ht 1.88 m (6' 2\")   Wt 98.2 kg (216 lb 8 oz)   SpO2 97%   BMI 27.80 kg/m    Body mass index is 27.8 kg/m .  Physical Exam  Constitutional:       General: He is not in acute distress.     Appearance: Normal appearance. He is not ill-appearing or toxic-appearing.   Cardiovascular:      Rate and Rhythm: Normal rate.   Pulmonary:      Effort: Pulmonary effort is normal. No respiratory distress.   Musculoskeletal:      Left hand: Normal. No swelling or deformity. Normal range of motion.   Skin:     General: Skin is warm and dry.   Neurological:      General: No focal deficit present.      Mental Status: He is alert and oriented to person, place, and time.   Psychiatric:         Mood and Affect: Mood normal.         Behavior: Behavior normal.            XR Finger Left " G/E 2 Views    Result Date: 10/30/2024  XR FINGER LEFT G/E 2 VIEWS 10/30/2024 10:11 AM HISTORY: Acute pain to left knuckle x 1 month. No injury.; Pain of finger of left hand COMPARISON: None.     IMPRESSION: The left index finger is negative for fracture or dislocation. ALENA INMAN MD   SYSTEM ID:  [id number]         Signed Electronically by: AMY Lopez CNP

## 2024-10-31 LAB — URATE SERPL-MCNC: 5.9 MG/DL (ref 3.4–7)

## 2024-11-21 ENCOUNTER — OFFICE VISIT (OUTPATIENT)
Dept: SURGERY | Facility: CLINIC | Age: 49
End: 2024-11-21
Payer: COMMERCIAL

## 2024-11-21 VITALS
SYSTOLIC BLOOD PRESSURE: 122 MMHG | HEART RATE: 75 BPM | OXYGEN SATURATION: 99 % | BODY MASS INDEX: 27.86 KG/M2 | WEIGHT: 217 LBS | RESPIRATION RATE: 16 BRPM | DIASTOLIC BLOOD PRESSURE: 76 MMHG

## 2024-11-21 DIAGNOSIS — Z98.890 HISTORY OF HERNIA REPAIR: ICD-10-CM

## 2024-11-21 DIAGNOSIS — Z87.19 HISTORY OF HERNIA REPAIR: ICD-10-CM

## 2024-11-21 NOTE — LETTER
November 21, 2024          Terialexx Fariasganesh, APRN CNP  [address] [place] [address]      RE:   Mehrdad Tobinr 1975      Dear Colleague,    Thank you for referring your patient, Mehrdad Sharif, to [facility]. Please see a copy of my visit note below.    HPI:  Mehrdad is a 49 year old male who presents for evaluation of left inguinal and testicular pain.  The patient had a left inguinal hernia repaired with a plug and patch technique in 2012.  In 2018, he had the anterior mesh removed due to pain in the region.  He did have some temporary relief following that, but had a few months of rather severe pain, particularly in the left testicle, recently.  He does feel the last 3 weeks have been a bit better.  At the time of his mesh removal, the mesh plug was left in place.    Past Medical History:   has no past medical history on file.    ROS:  The 10 point review of systems is negative other than noted in the HPI and above.    PE:    General- Well-developed, well-nourished, patient able to get up on table without difficulty.  HEENT- Normocephalic and atraumatic. Pupils equal and round.  Mucous membranes moist.  Sclera are nonicteric.  Neck- No lymphadenopathy or masses   Respirations- are regular and non labored  Abdomen is abdomen is soft without significant tenderness, masses, organomegaly or guarding  Hernia- Left inguinal hernia is not present with valsalva              Right inguinal hernia is not present with valsalva              External genitalia are normal               Assesment: Left groin pain with history of left inguinal hernia repair.    Plan: The patient has no obvious recurrence on the left side.  He does have a fair amount of tenderness along the spermatic cord, however.  Without an obvious recurrent hernia, there are couple of options on how to proceed.  Since he has been better over the last few weeks, it would certainly be  reasonable to keep an eye on this and see if the pain worsens once again.  If he continues to have significant pain, it might be worth a robotic approach to try to remove the patient's mesh plug.  This would involve a significant risk of disturbing the blood supply to the testicle, which could conceivably lead to loss of the testicle.  If we were to remove that mesh, I would recommend placing a new preperitoneal mesh during that surgery, as future repairs would be more difficult.  The patient will consider his options at this time.  We did discuss surgery, along with its risks and complications, in detail.  We have discussed observation, reduction techniques and importance, incarceration and strangulation signs, symptoms and importance as well as need to seek emergency treatment.    We have discussed surgery in detail, including risk, benefits, complications, mesh, infection, nerve and cord damage and their sequelae including chronic pain and testicular loss, lifting and activity limits after surgery. He has been given literature to review.  The patient will contact us if he would like to schedule surgery.      Again, thank you for allowing me to participate in the care of your patient.      Sincerely,      Blayne Kumar MD    DFB/nms

## 2024-11-21 NOTE — PROGRESS NOTES
HPI:  Mehrdad is a 49 year old male who presents for evaluation of left inguinal and testicular pain.  The patient had a left inguinal hernia repaired with a plug and patch technique in 2012.  In 2018, he had the anterior mesh removed due to pain in the region.  He did have some temporary relief following that, but had a few months of rather severe pain, particularly in the left testicle, recently.  He does feel the last 3 weeks have been a bit better.  At the time of his mesh removal, the mesh plug was left in place.    Past Medical History:   has no past medical history on file.    Past Surgical History:  Past Surgical History:   Procedure Laterality Date    ABDOMEN SURGERY      pyloric stenosis as a child    COLONOSCOPY N/A 10/21/2024    Procedure: Colonoscopy;  Surgeon: Bryan Moore MD;  Location:  GI    EYE MUSCLE SURGERY      childhood    HAND SURGERY Right 2022    INGUINAL HERNIA REPAIR Left 2005    SHOULDER SURGERY Right 2010    VASECTOMY          Social History:  Social History     Socioeconomic History    Marital status: Legally      Spouse name: Not on file    Number of children: Not on file    Years of education: Not on file    Highest education level: Not on file   Occupational History    Not on file   Tobacco Use    Smoking status: Never     Passive exposure: Never    Smokeless tobacco: Never   Vaping Use    Vaping status: Never Used   Substance and Sexual Activity    Alcohol use: Yes     Comment: 10/week    Drug use: Yes     Types: Marijuana     Comment: once a week    Sexual activity: Yes     Partners: Female   Other Topics Concern    Not on file   Social History Narrative     2 years ago.     Two kids, they go to [employer].     Enjoys running, 26 miles/week.     Dating.         Teri Melendez, FEDERICO, APRN, CNP    09/09/24         Social Drivers of Health     Financial Resource Strain: Low Risk  (7/16/2024)    Financial Resource Strain     Within the past 12 months,  have you or your family members you live with been unable to get utilities (heat, electricity) when it was really needed?: No   Food Insecurity: Low Risk  (7/16/2024)    Food Insecurity     Within the past 12 months, did you worry that your food would run out before you got money to buy more?: No     Within the past 12 months, did the food you bought just not last and you didn t have money to get more?: No   Transportation Needs: Low Risk  (7/16/2024)    Transportation Needs     Within the past 12 months, has lack of transportation kept you from medical appointments, getting your medicines, non-medical meetings or appointments, work, or from getting things that you need?: No   Physical Activity: Insufficiently Active (1/13/2021)    Received from [employer]    Exercise Vital Sign     Days of Exercise per Week: 3 days     Minutes of Exercise per Session: 30 min   Stress: Not on file   Social Connections: Unknown (5/30/2023)    Received from [employer]    Social Connections     Frequency of Communication with Friends and Family: Not on file   Interpersonal Safety: Low Risk  (10/21/2024)    Interpersonal Safety     Do you feel physically and emotionally safe where you currently live?: Yes     Within the past 12 months, have you been hit, slapped, kicked or otherwise physically hurt by someone?: No     Within the past 12 months, have you been humiliated or emotionally abused in other ways by your partner or ex-partner?: No   Housing Stability: Low Risk  (7/16/2024)    Housing Stability     Do you have housing? : Yes     Are you worried about losing your housing?: No        Family History:  Family History   Problem Relation Age of Onset    Heart Defect Father         thick valve?    Cancer Cousin     Cancer Cousin     Cancer Paternal Aunt     Colon Cancer No family hx of          ROS:  The 10 point review of systems is negative other than noted in the HPI  and above.    PE:    General- Well-developed, well-nourished, patient able to get up on table without difficulty.  HEENT- Normocephalic and atraumatic. Pupils equal and round.  Mucous membranes moist.  Sclera are nonicteric.  Neck- No lymphadenopathy or masses   Respirations- are regular and non labored  Abdomen is abdomen is soft without significant tenderness, masses, organomegaly or guarding  Hernia- Left inguinal hernia is not present with valsalva              Right inguinal hernia is not present with valsalva              External genitalia are normal               Assesment: Left groin pain with history of left inguinal hernia repair.    Plan: The patient has no obvious recurrence on the left side.  He does have a fair amount of tenderness along the spermatic cord, however.  Without an obvious recurrent hernia, there are couple of options on how to proceed.  Since he has been better over the last few weeks, it would certainly be reasonable to keep an eye on this and see if the pain worsens once again.  If he continues to have significant pain, it might be worth a robotic approach to try to remove the patient's mesh plug.  This would involve a significant risk of disturbing the blood supply to the testicle, which could conceivably lead to loss of the testicle.  If we were to remove that mesh, I would recommend placing a new preperitoneal mesh during that surgery, as future repairs would be more difficult.  The patient will consider his options at this time.  We did discuss surgery, along with its risks and complications, in detail.  We have discussed observation, reduction techniques and importance, incarceration and strangulation signs, symptoms and importance as well as need to seek emergency treatment.    We have discussed surgery in detail, including risk, benefits, complications, mesh, infection, nerve and cord damage and their sequelae including chronic pain and testicular loss, lifting and activity  limits after surgery. He has been given literature to review.  The patient will contact us if he would like to schedule surgery.        Blayne Kuamr MD    Please route or send letter to:  Referring Provider

## 2024-12-26 ENCOUNTER — ALLIED HEALTH/NURSE VISIT (OUTPATIENT)
Dept: PEDIATRICS | Facility: CLINIC | Age: 49
End: 2024-12-26
Payer: COMMERCIAL

## 2024-12-26 DIAGNOSIS — Z23 ENCOUNTER FOR IMMUNIZATION: Primary | ICD-10-CM

## 2024-12-26 NOTE — PROGRESS NOTES
Prior to immunization administration, verified patients identity using patient s name and date of birth. Please see Immunization Activity for additional information.     Is the patient's temperature normal (100.5 or less)? Yes     Patient MEETS CRITERIA. PROCEED with vaccine administration.      Patient instructed to remain in clinic for 15 minutes afterwards, and to report any adverse reactions.      Link to Ancillary Visit Immunization Standing Orders SmartSet     Screening performed by Ann-Marie Rogers MA on 12/26/2024 at 9:28 AM.

## 2025-03-06 ENCOUNTER — MYC MEDICAL ADVICE (OUTPATIENT)
Dept: PEDIATRICS | Facility: CLINIC | Age: 50
End: 2025-03-06
Payer: COMMERCIAL

## 2025-03-06 DIAGNOSIS — G47.33 OSA (OBSTRUCTIVE SLEEP APNEA): Primary | ICD-10-CM

## 2025-03-06 NOTE — TELEPHONE ENCOUNTER
Patient requesting new CPAP machine. Per chart review, last sleep study done on 7/20/14. Per sleep study:  The apnea hypopnea index (AHI) was 11.3 per hr.   CPAP therapy, 5-20 cm of water pressure with heated humidifier, nasal mask, headgear, filters and tubing. For home use. Length of Need: 99      DME pended. Awaiting patient response on medical supply store.    Gladis TAYLOR RN, BSN  Clinic RN  [facility]

## 2025-03-31 ENCOUNTER — DOCUMENTATION ONLY (OUTPATIENT)
Dept: SLEEP MEDICINE | Facility: CLINIC | Age: 50
End: 2025-03-31
Payer: COMMERCIAL

## 2025-03-31 DIAGNOSIS — G47.33 OBSTRUCTIVE SLEEP APNEA: Primary | ICD-10-CM

## 2025-03-31 NOTE — PROGRESS NOTES
Patient was offered choice of vendor and chose [facility].  Patient Mehrdad Sharif was set up at [place] on March 31, 2025. Patient received a Resmed Airsense 11 Pressures were set at  5-20 cm H2O.   Patient s ramp is 5 cm H2O for Auto and FLEX/EPR is EPR, 2.  Patient received a Resmed Mask name: AIRFIT P10  Pillow mask size Medium, heated tubing and heated humidifier.  Patient has the following compliance requirements: none    Delmy Polo

## 2025-06-18 ENCOUNTER — VIRTUAL VISIT (OUTPATIENT)
Dept: PEDIATRICS | Facility: CLINIC | Age: 50
End: 2025-06-18
Payer: COMMERCIAL

## 2025-06-18 DIAGNOSIS — R13.10 DYSPHAGIA, UNSPECIFIED TYPE: ICD-10-CM

## 2025-06-18 DIAGNOSIS — R21 RASH AND NONSPECIFIC SKIN ERUPTION: Primary | ICD-10-CM

## 2025-06-18 PROCEDURE — 98006 SYNCH AUDIO-VIDEO EST MOD 30: CPT | Performed by: PHYSICIAN ASSISTANT

## 2025-06-18 NOTE — PATIENT INSTRUCTIONS
Lotrimin (clotrimazole) or Lamisil (terbinafine)  -twice daily for min of 1 week and then 1 week more once improved. Max 4 weeks     Hydrocortisone (topical steroid)   -twice daily for no more than 2 weeks; Then as needed    Ordered EGD

## 2025-06-18 NOTE — PROGRESS NOTES
"Adam is a 49 year old who is being evaluated via a billable video visit.          Assessment & Plan     Rash and nonspecific skin eruption  Recommend trial of fungal medication.   Consider small amount of hydrocortisone in the groin to help with itch.   Difficult over video to figure out if these are related or not.   Clinically suspect both are fungal.   Follow-up in 1 month if needed. Rec in-person visit.  Lotrimin (clotrimazole) or Lamisil (terbinafine)  -twice daily for min of 1 week and then 1 week more once improved. Max 4 weeks   Hydrocortisone (topical steroid)   -twice daily for no more than 2 weeks; Then as needed    Dysphagia, unspecified type  Due to dysphagia recommend EGD.   Discussed reasons to present to the ER.  - Adult GI  Referral - Procedure Only    BMI  Estimated body mass index is 27.86 kg/m  as calculated from the following:    Height as of 10/30/24: 1.88 m (6' 2\").    Weight as of 11/21/24: 98.4 kg (217 lb).       30 minutes spent by me on the date of the encounter doing chart review, history and exam, documentation and further activities per the note.    FUTURE APPOINTMENTS:  -Follow-up as needed for acute concerns.      Subjective   Adam is a 49 year old, presenting for the following health issues:  1.) Rash in groin  2.) Dysphagia     HPI    Red patch -no itching. Little bumps. Noticeable. 6 months     History of Present Illness-  Adam Sharif, 49-year-old male    Dysphagia  - Difficulty swallowing solid foods for about 3 to 4 months, with episodes of food getting stuck, especially with dry foods like deli meat.  - Symptoms occur more frequently when eating quickly or on the go.  - Drinking water sometimes exacerbates the sensation of food being stuck.  - No noticeable reflux symptoms.    Rash chest  - Rash on the chest present for 4 to 6 months, characterized by a red patch approximately 4 to 5 inches, non-itchy, with occasional small bumps.  -Reports hx rosacea, wondering if this " could be present on chest. Has rx for metronidazole     Rash groin  - Rash in the groin area started 4 to 6 months ago, initially very itchy and swollen, centered on the left side, improved over the last week with reduced swelling and itching.  -Pt does often run.  -Has not tried applying anything  -Feels swelling/fullness was due to itching        Review of Systems  Constitutional, HEENT, cardiovascular, pulmonary, gi and gu systems are negative, except as otherwise noted.      Objective       Vitals:  No vitals were obtained today due to virtual visit.    Physical Exam   GENERAL: alert and no distress  EYES: Eyes grossly normal to inspection.  No discharge or erythema, or obvious scleral/conjunctival abnormalities.  RESP: No audible wheeze, cough, or visible cyanosis.    SKIN: Visible skin clear. No significant rash, abnormal pigmentation or lesions.  NEURO: Cranial nerves grossly intact.  Mentation and speech appropriate for age.  PSYCH: Appropriate affect, tone, and pace of words        Video-Visit Details    Type of service:  Video Visit   Originating Location (pt. Location): Home    Distant Location (provider location):  On-site  Platform used for Video Visit: Fiorella  Signed Electronically by: Sydney Lazo PA-C    The longitudinal plan of care for the diagnosis(es)/condition(s) as documented were addressed during this visit. Due to the added complexity in care, I will continue to support Adam in the subsequent management and with ongoing continuity of care.

## 2025-06-24 ENCOUNTER — HOSPITAL ENCOUNTER (OUTPATIENT)
Facility: CLINIC | Age: 50
Discharge: HOME | End: 2025-06-24
Attending: INTERNAL MEDICINE | Admitting: INTERNAL MEDICINE
Payer: COMMERCIAL

## 2025-06-24 VITALS
DIASTOLIC BLOOD PRESSURE: 74 MMHG | HEIGHT: 74 IN | SYSTOLIC BLOOD PRESSURE: 112 MMHG | OXYGEN SATURATION: 97 % | WEIGHT: 210 LBS | BODY MASS INDEX: 26.95 KG/M2 | RESPIRATION RATE: 15 BRPM | HEART RATE: 60 BPM | TEMPERATURE: 97.6 F

## 2025-06-24 LAB — UPPER GI ENDOSCOPY: NORMAL

## 2025-06-24 PROCEDURE — 43239 EGD BIOPSY SINGLE/MULTIPLE: CPT | Performed by: INTERNAL MEDICINE

## 2025-06-24 PROCEDURE — 250N000009 HC RX 250: Performed by: INTERNAL MEDICINE

## 2025-06-24 PROCEDURE — 999N000099 HC STATISTIC MODERATE SEDATION < 10 MIN: Performed by: INTERNAL MEDICINE

## 2025-06-24 PROCEDURE — 88305 TISSUE EXAM BY PATHOLOGIST: CPT | Mod: TC | Performed by: INTERNAL MEDICINE

## 2025-06-24 PROCEDURE — 250N000011 HC RX IP 250 OP 636: Performed by: INTERNAL MEDICINE

## 2025-06-24 RX ORDER — FLUMAZENIL 0.1 MG/ML
0.2 INJECTION, SOLUTION INTRAVENOUS
Status: DISCONTINUED | OUTPATIENT
Start: 2025-06-24 | End: 2025-06-24 | Stop reason: HOSPADM

## 2025-06-24 RX ORDER — ONDANSETRON 4 MG/1
4 TABLET, ORALLY DISINTEGRATING ORAL EVERY 6 HOURS PRN
Status: DISCONTINUED | OUTPATIENT
Start: 2025-06-24 | End: 2025-06-24 | Stop reason: HOSPADM

## 2025-06-24 RX ORDER — SIMETHICONE 40MG/0.6ML
133 SUSPENSION, DROPS(FINAL DOSAGE FORM)(ML) ORAL
Status: DISCONTINUED | OUTPATIENT
Start: 2025-06-24 | End: 2025-06-24 | Stop reason: HOSPADM

## 2025-06-24 RX ORDER — NALOXONE HYDROCHLORIDE 0.4 MG/ML
0.2 INJECTION, SOLUTION INTRAMUSCULAR; INTRAVENOUS; SUBCUTANEOUS
Status: DISCONTINUED | OUTPATIENT
Start: 2025-06-24 | End: 2025-06-24 | Stop reason: HOSPADM

## 2025-06-24 RX ORDER — ONDANSETRON 2 MG/ML
4 INJECTION INTRAMUSCULAR; INTRAVENOUS
Status: DISCONTINUED | OUTPATIENT
Start: 2025-06-24 | End: 2025-06-24 | Stop reason: HOSPADM

## 2025-06-24 RX ORDER — EPINEPHRINE 1 MG/ML
0.1 INJECTION, SOLUTION, CONCENTRATE INTRAVENOUS
Status: DISCONTINUED | OUTPATIENT
Start: 2025-06-24 | End: 2025-06-24 | Stop reason: HOSPADM

## 2025-06-24 RX ORDER — FENTANYL CITRATE 50 UG/ML
25-100 INJECTION, SOLUTION INTRAMUSCULAR; INTRAVENOUS EVERY 5 MIN PRN
Refills: 0 | Status: DISCONTINUED | OUTPATIENT
Start: 2025-06-24 | End: 2025-06-24 | Stop reason: HOSPADM

## 2025-06-24 RX ORDER — NALOXONE HYDROCHLORIDE 0.4 MG/ML
0.4 INJECTION, SOLUTION INTRAMUSCULAR; INTRAVENOUS; SUBCUTANEOUS
Status: DISCONTINUED | OUTPATIENT
Start: 2025-06-24 | End: 2025-06-24 | Stop reason: HOSPADM

## 2025-06-24 RX ORDER — DIPHENHYDRAMINE HYDROCHLORIDE 50 MG/ML
25-50 INJECTION, SOLUTION INTRAMUSCULAR; INTRAVENOUS
Status: DISCONTINUED | OUTPATIENT
Start: 2025-06-24 | End: 2025-06-24 | Stop reason: HOSPADM

## 2025-06-24 RX ORDER — ONDANSETRON 2 MG/ML
4 INJECTION INTRAMUSCULAR; INTRAVENOUS EVERY 6 HOURS PRN
Status: DISCONTINUED | OUTPATIENT
Start: 2025-06-24 | End: 2025-06-24 | Stop reason: HOSPADM

## 2025-06-24 RX ORDER — ATROPINE SULFATE 0.1 MG/ML
1 INJECTION INTRAVENOUS
Status: DISCONTINUED | OUTPATIENT
Start: 2025-06-24 | End: 2025-06-24 | Stop reason: HOSPADM

## 2025-06-24 RX ORDER — LIDOCAINE 40 MG/G
CREAM TOPICAL
Status: DISCONTINUED | OUTPATIENT
Start: 2025-06-24 | End: 2025-06-24 | Stop reason: HOSPADM

## 2025-06-24 RX ORDER — PROCHLORPERAZINE MALEATE 10 MG
10 TABLET ORAL EVERY 6 HOURS PRN
Status: DISCONTINUED | OUTPATIENT
Start: 2025-06-24 | End: 2025-06-24 | Stop reason: HOSPADM

## 2025-06-24 RX ADMIN — TOPICAL ANESTHETIC 0.5 ML: 200 SPRAY DENTAL; PERIODONTAL at 12:16

## 2025-06-24 RX ADMIN — FENTANYL CITRATE 100 MCG: 50 INJECTION INTRAMUSCULAR; INTRAVENOUS at 12:14

## 2025-06-24 RX ADMIN — MIDAZOLAM 2 MG: 1 INJECTION INTRAMUSCULAR; INTRAVENOUS at 12:14

## 2025-06-24 ASSESSMENT — ACTIVITIES OF DAILY LIVING (ADL): ADLS_ACUITY_SCORE: 41

## 2025-06-24 NOTE — H&P
Pre-Endoscopy History and Physical     Mehrdad Sharif MRN# [id number]   YOB: 1975 Age: 49 year old     Date of Procedure: 6/24/2025  Primary care provider: Teri Melendez  Type of Endoscopy: Gastroscopy with possible biopsy, possible dilation  Reason for Procedure: dysphagia  Type of Anesthesia Anticipated: Conscious Sedation    HPI:    Mehrdad is a 49 year old male who will be undergoing the above procedure.      A history and physical has been performed. The patient's medications and allergies have been reviewed. The risks and benefits of the procedure and the sedation options and risks were discussed with the patient.  All questions were answered and informed consent was obtained.      He denies a personal or family history of anesthesia complications or bleeding disorders.     Patient Active Problem List   Diagnosis    Recurrent cold sores    ROBERT on CPAP    Numbness of feet    Idiopathic scoliosis and kyphoscoliosis        Past Medical History:   Diagnosis Date    Sleep apnea         Past Surgical History:   Procedure Laterality Date    ABDOMEN SURGERY      pyloric stenosis as a child    COLONOSCOPY N/A 10/21/2024    Procedure: Colonoscopy;  Surgeon: Bryan Moore MD;  Location:  GI    EYE MUSCLE SURGERY      childhood    HAND SURGERY Right 2022    INGUINAL HERNIA REPAIR Left 2005    SHOULDER SURGERY Right 2010    VASECTOMY         Social History     Tobacco Use    Smoking status: Never     Passive exposure: Never    Smokeless tobacco: Never   Substance Use Topics    Alcohol use: Yes     Comment: 3x per week 3 drinks       Family History   Problem Relation Age of Onset    Heart Defect Father         thick valve?    Cancer Cousin     Cancer Cousin     Cancer Paternal Aunt     Colon Cancer No family hx of        Prior to Admission medications    Medication Sig Start Date End Date Taking? Authorizing Provider   cyclobenzaprine (FLEXERIL) 10 MG tablet Take 5-10 mg by mouth. 9/13/23  Yes  "Reported, Patient   metroNIDAZOLE (METROCREAM) 0.75 % external cream Apply topically 2 times daily. 9/9/24  Yes Teri Melendez APRN CNP   Multiple Vitamin (ONE-A-DAY ESSENTIAL) TABS Take 1 tablet by mouth daily   Yes Reported, Patient   valACYclovir (VALTREX) 1000 mg tablet Take 2 tablets (2,000 mg) by mouth 2 times daily. 1/31/25  Yes Teri Melendez APRN CNP   tadalafil (CIALIS) 10 MG tablet Take one tablet (10 mg) by mouth as needed >=30 minutes prior to anticipated sexual activity. 1/31/25   Teri Melendez APRN CNP       No Known Allergies     REVIEW OF SYSTEMS:   5 point ROS negative except as noted above in HPI, including Gen., Resp., CV, GI &  system review.    PHYSICAL EXAM:   /77   Pulse 60   Temp 97.6  F (36.4  C) (Tympanic)   Resp 13   Ht 1.88 m (6' 2\")   Wt 95.3 kg (210 lb)   SpO2 98%   BMI 26.96 kg/m   Estimated body mass index is 26.96 kg/m  as calculated from the following:    Height as of this encounter: 1.88 m (6' 2\").    Weight as of this encounter: 95.3 kg (210 lb).   GENERAL APPEARANCE: alert, and oriented  MENTAL STATUS: alert  AIRWAY EXAM: Mallampatti Class I (visualization of the soft palate, fauces, uvula, anterior and posterior pillars)  RESP: lungs clear to auscultation - no rales, rhonchi or wheezes  CV: regular rates and rhythm  DIAGNOSTICS:    Not indicated    IMPRESSION   ASA Class 2 - Mild systemic disease    PLAN:   Plan for Gastroscopy with possible biopsy, possible dilation. We discussed the risks, benefits and alternatives and the patient wished to proceed.    The above has been forwarded to the consulting provider.      Signed Electronically by: Niko Blackman MD  June 24, 2025          "

## 2025-06-25 ENCOUNTER — RESULTS FOLLOW-UP (OUTPATIENT)
Dept: SURGERY | Facility: CLINIC | Age: 50
End: 2025-06-25

## 2025-06-25 LAB
PATH REPORT.COMMENTS IMP SPEC: NORMAL
PATH REPORT.FINAL DX SPEC: NORMAL
PATH REPORT.GROSS SPEC: NORMAL
PATH REPORT.MICROSCOPIC SPEC OTHER STN: NORMAL
PATH REPORT.RELEVANT HX SPEC: NORMAL
PHOTO IMAGE: NORMAL

## 2025-06-25 PROCEDURE — 88305 TISSUE EXAM BY PATHOLOGIST: CPT | Mod: 26 | Performed by: PATHOLOGY

## 2025-08-09 ENCOUNTER — HEALTH MAINTENANCE LETTER (OUTPATIENT)
Age: 50
End: 2025-08-09

## 2025-08-25 ENCOUNTER — MYC MEDICAL ADVICE (OUTPATIENT)
Dept: PEDIATRICS | Facility: CLINIC | Age: 50
End: 2025-08-25
Payer: COMMERCIAL

## (undated) DEVICE — ENDO BITE BLOCK ADULT OLYMPUS LATEX FREE MAJ-1632

## (undated) DEVICE — ENDO FORCEP ENDOJAW BIOPSY 2.8MMX230CM FB-220U

## (undated) DEVICE — KIT ENDO TURNOVER/PROCEDURE W/CLEAN A SCOPE LINERS 103888

## (undated) RX ORDER — FENTANYL CITRATE 50 UG/ML
INJECTION, SOLUTION INTRAMUSCULAR; INTRAVENOUS
Status: DISPENSED
Start: 2024-10-21

## (undated) RX ORDER — FENTANYL CITRATE 50 UG/ML
INJECTION, SOLUTION INTRAMUSCULAR; INTRAVENOUS
Status: DISPENSED
Start: 2025-06-24